# Patient Record
Sex: FEMALE | URBAN - METROPOLITAN AREA
[De-identification: names, ages, dates, MRNs, and addresses within clinical notes are randomized per-mention and may not be internally consistent; named-entity substitution may affect disease eponyms.]

---

## 2017-09-23 ENCOUNTER — HOSPITAL ENCOUNTER (EMERGENCY)
Facility: CLINIC | Age: 27
Discharge: HOME | End: 2017-09-24
Attending: EMERGENCY MEDICINE | Admitting: EMERGENCY MEDICINE
Payer: MEDICAID

## 2017-09-23 DIAGNOSIS — R41.0 DELIRIUM: ICD-10-CM

## 2017-09-23 DIAGNOSIS — Z65.9 OTHER SOCIAL STRESSOR: ICD-10-CM

## 2017-09-23 DIAGNOSIS — F10.929 ALCOHOL INTOXICATION, WITH UNSPECIFIED COMPLICATION (H): ICD-10-CM

## 2017-09-23 LAB
ALBUMIN UR-MCNC: NEGATIVE MG/DL
AMPHETAMINES UR QL SCN: NEGATIVE
ANION GAP SERPL CALCULATED.3IONS-SCNC: 12 MMOL/L (ref 3–14)
APPEARANCE UR: CLEAR
BARBITURATES UR QL: NEGATIVE
BASOPHILS # BLD AUTO: 0.1 10E9/L (ref 0–0.2)
BASOPHILS NFR BLD AUTO: 1.8 %
BENZODIAZ UR QL: NEGATIVE
BILIRUB UR QL STRIP: NEGATIVE
BUN SERPL-MCNC: 9 MG/DL (ref 7–30)
CALCIUM SERPL-MCNC: 8.9 MG/DL (ref 8.5–10.1)
CANNABINOIDS UR QL SCN: NEGATIVE
CHLORIDE SERPL-SCNC: 110 MMOL/L (ref 94–109)
CO2 SERPL-SCNC: 23 MMOL/L (ref 20–32)
COCAINE UR QL: NEGATIVE
COLOR UR AUTO: NORMAL
CREAT SERPL-MCNC: 0.46 MG/DL (ref 0.52–1.04)
DIFFERENTIAL METHOD BLD: NORMAL
EOSINOPHIL # BLD AUTO: 0.2 10E9/L (ref 0–0.7)
EOSINOPHIL NFR BLD AUTO: 2.8 %
ERYTHROCYTE [DISTWIDTH] IN BLOOD BY AUTOMATED COUNT: 12.5 % (ref 10–15)
ETHANOL SERPL-MCNC: 0.2 G/DL
GFR SERPL CREATININE-BSD FRML MDRD: >90 ML/MIN/1.7M2
GLUCOSE SERPL-MCNC: 97 MG/DL (ref 70–99)
GLUCOSE UR STRIP-MCNC: NEGATIVE MG/DL
HCG SERPL QL: NEGATIVE
HCG UR QL: NEGATIVE
HCT VFR BLD AUTO: 38.7 % (ref 35–47)
HGB BLD-MCNC: 13.2 G/DL (ref 11.7–15.7)
HGB UR QL STRIP: NEGATIVE
IMM GRANULOCYTES # BLD: 0 10E9/L (ref 0–0.4)
IMM GRANULOCYTES NFR BLD: 0.3 %
KETONES UR STRIP-MCNC: NEGATIVE MG/DL
LEUKOCYTE ESTERASE UR QL STRIP: NEGATIVE
LYMPHOCYTES # BLD AUTO: 2.7 10E9/L (ref 0.8–5.3)
LYMPHOCYTES NFR BLD AUTO: 39.3 %
MAGNESIUM SERPL-MCNC: 2.4 MG/DL (ref 1.6–2.3)
MCH RBC QN AUTO: 31.3 PG (ref 26.5–33)
MCHC RBC AUTO-ENTMCNC: 34.1 G/DL (ref 31.5–36.5)
MCV RBC AUTO: 92 FL (ref 78–100)
MONOCYTES # BLD AUTO: 0.7 10E9/L (ref 0–1.3)
MONOCYTES NFR BLD AUTO: 9.6 %
NEUTROPHILS # BLD AUTO: 3.1 10E9/L (ref 1.6–8.3)
NEUTROPHILS NFR BLD AUTO: 46.2 %
NITRATE UR QL: NEGATIVE
NRBC # BLD AUTO: 0 10*3/UL
NRBC BLD AUTO-RTO: 0 /100
OPIATES UR QL SCN: NEGATIVE
PCP UR QL SCN: NEGATIVE
PH UR STRIP: 7 PH (ref 5–7)
PLATELET # BLD AUTO: 324 10E9/L (ref 150–450)
POTASSIUM SERPL-SCNC: 3.2 MMOL/L (ref 3.4–5.3)
RBC # BLD AUTO: 4.22 10E12/L (ref 3.8–5.2)
RBC #/AREA URNS AUTO: <1 /HPF (ref 0–2)
SODIUM SERPL-SCNC: 145 MMOL/L (ref 133–144)
SOURCE: NORMAL
SP GR UR STRIP: 1.01 (ref 1–1.03)
SQUAMOUS #/AREA URNS AUTO: <1 /HPF (ref 0–1)
UROBILINOGEN UR STRIP-MCNC: 0 MG/DL (ref 0–2)
WBC # BLD AUTO: 6.8 10E9/L (ref 4–11)
WBC #/AREA URNS AUTO: 1 /HPF (ref 0–2)

## 2017-09-23 PROCEDURE — 83735 ASSAY OF MAGNESIUM: CPT | Performed by: EMERGENCY MEDICINE

## 2017-09-23 PROCEDURE — 80320 DRUG SCREEN QUANTALCOHOLS: CPT | Performed by: EMERGENCY MEDICINE

## 2017-09-23 PROCEDURE — 96376 TX/PRO/DX INJ SAME DRUG ADON: CPT

## 2017-09-23 PROCEDURE — 84703 CHORIONIC GONADOTROPIN ASSAY: CPT | Performed by: EMERGENCY MEDICINE

## 2017-09-23 PROCEDURE — 80307 DRUG TEST PRSMV CHEM ANLYZR: CPT | Performed by: EMERGENCY MEDICINE

## 2017-09-23 PROCEDURE — 25000128 H RX IP 250 OP 636: Performed by: EMERGENCY MEDICINE

## 2017-09-23 PROCEDURE — 81025 URINE PREGNANCY TEST: CPT | Performed by: EMERGENCY MEDICINE

## 2017-09-23 PROCEDURE — 80048 BASIC METABOLIC PNL TOTAL CA: CPT | Performed by: EMERGENCY MEDICINE

## 2017-09-23 PROCEDURE — 25000128 H RX IP 250 OP 636

## 2017-09-23 PROCEDURE — 96374 THER/PROPH/DIAG INJ IV PUSH: CPT

## 2017-09-23 PROCEDURE — 81001 URINALYSIS AUTO W/SCOPE: CPT | Performed by: EMERGENCY MEDICINE

## 2017-09-23 PROCEDURE — 85025 COMPLETE CBC W/AUTO DIFF WBC: CPT | Performed by: EMERGENCY MEDICINE

## 2017-09-23 PROCEDURE — 99285 EMERGENCY DEPT VISIT HI MDM: CPT | Mod: 25

## 2017-09-23 RX ORDER — LORAZEPAM 2 MG/ML
1 INJECTION INTRAMUSCULAR ONCE
Status: COMPLETED | OUTPATIENT
Start: 2017-09-23 | End: 2017-09-23

## 2017-09-23 RX ORDER — LORAZEPAM 2 MG/ML
INJECTION INTRAMUSCULAR
Status: COMPLETED
Start: 2017-09-23 | End: 2017-09-23

## 2017-09-23 RX ADMIN — LORAZEPAM 1 MG: 2 INJECTION INTRAMUSCULAR at 20:11

## 2017-09-23 RX ADMIN — LORAZEPAM 1 MG: 2 INJECTION INTRAMUSCULAR; INTRAVENOUS at 21:03

## 2017-09-23 RX ADMIN — LORAZEPAM 1 MG: 2 INJECTION INTRAMUSCULAR; INTRAVENOUS at 20:11

## 2017-09-23 ASSESSMENT — ENCOUNTER SYMPTOMS
ABDOMINAL PAIN: 0
NAUSEA: 0
VOMITING: 0
NERVOUS/ANXIOUS: 1

## 2017-09-23 NOTE — ED AVS SNAPSHOT
[facility] Emergency Department    [address] [place] [address]    Phone:  [phone]    Fax:  [phone]                                       Diane Ryan   MRN: [id number]    Department:  [facility] Emergency Department   Date of Visit:  9/23/2017           Patient Information     Date Of Birth          1990        Your diagnoses for this visit were:     Delirium     Alcohol intoxication, with unspecified complication (H)     Other social stressor        You were seen by Lester Goldstein DO, Amdahl, John, MD, and Mario Winston MD.      Follow-up Information     Follow up with PCP as well as mental health as directed by DEC. .        Discharge Instructions         Alcohol Intoxication  Alcohol intoxication occurs when you drink alcohol faster than your liver can remove it from your system. The following facts are important to remember:    It can take 10 minutes or more to start to feel the effects of a drink, so you can easily get more intoxicated than you intended.    One drink may be more than 1 serving of alcohol. Depending on the drink, it can be 2 to 4 servings.    It takes about an hour for your body to metabolize (clear) 1 serving. If you have more than 1 drink, it can take a couple of hours or more.    Many things affect how drinks will affect you, including whether you ve eaten, how fast you drink, your size, how much you normally drink (or not), medicines you take, chronic diseases you have, and gender.  Signs and symptoms of alcohol poisoning  The following are signs and symptoms of alcohol poisoning:  Mild impairment    Reduced inhibitions    Slurred speech    Drowsiness    Decreased fine motor skills  Moderate impairment    Erratic behavior, aggression, depression    Impaired judgment    Confusion    Concentration difficulties    Coordination problems  Severe impairment    Vomiting    Seizures    Unconsciousness    Cold,  "clammy    Slow or irregular breathing    Hypothermia (low body temperature)    Coma  Health effects  Alcohol abuse causes health problems. Sometimes this can happen after only drinking a  little.\" There is no set number of drinks or amount of alcohol that defines too much. The more you drink at one time, and the more frequently you drink determine both the short-term and long-term health effects. It affects all parts of your body and your health, including your:    Brain. Alcohol is a central nervous system depressant. It can damage parts of the brain that affect your balance, memory, thinking, and emotions. It can cause memory loss, blackouts, depression, agitation, sleep cycle changes, and seizures. These changes may or may not be reversible.    Heart and vascular system. Alcohol affects multiple areas. It can damage heart muscle causing cardiomyopathy, which is a weakening and stretching of the heart muscle. This can lead to trouble breathing, an irregular heartbeat, atrial fibrillation, leg swelling, and heart failure. It makes the blood vessels stiffen causing hypertension (high blood pressure). All of these problems increase your risk of having heart attacks or strokes.    Liver. Alcohol causes fat to build up in the liver, affecting its normal function. This increases the risk for hepatitis, leading to abdominal pain, appetite loss, jaundice, bleeding problems, liver fibrosis, and cirrhosis. This in turn can affect your ability to fight off infections, and can cause diabetes. The liver changes prevent it from removing toxins in your blood that can cause encephalopathy. Signs of this are confusion, altered level of consciousness, personality changes, memory loss, seizures, coma, and death.    Pancreas. Alcohol can cause inflammation of the pancreas, or pancreatitis. This can cause pain in your abdomen, fever, and diabetes.    Immune system. Alcohol weakens your immune system in a number of ways. It suppresses " your immune system making it harder to fight off infections and colds. You will also have a higher risk of certain infections like pneumonia and tuberculosis.    Cancer risk. Alcohol raises your risk of cancer of the mouth, esophagus, pharynx, larynx, liver, and breast.    Sexual function. Alcohol abuse can also lead to sexual problems.  Alcohol use during pregnancy may cause permanent damage to the growing baby.  Home care  The following guidelines will help you care for yourself at home:    Don't drink any more alcohol.    Don't drive until all effects of the alcohol have worn off.    Don't operate machinery that can cause injuries.    Get lots of rest over the next few days. Drink plenty of water and other non-alcoholic liquids. Try to eat regular meals.    If you have been drinking heavily on a daily basis, you may go through alcohol withdrawal. The usual symptoms last 3 to 4 days and may include nervousness, shakiness, nausea, sweating, sleeplessness, and can even cause seizures and a serious withdrawal symptom called delirium tremens, or DTs. During this time, it is best that you stay with family or friends who can help and support you. You can also admit yourself to a residential detox program. If your symptoms are severe (seizures, severe shakiness, confusion), contact your doctor or call an ambulance for help (see below).   Follow-up care  If alcohol is a problem in your life, these are some organizations that can help you:    Alcoholics Anonymous offers support through a self-help fellowship. There are no dues or fees. See the Yellow Pages and call for time and place of meetings. Find AA online at www.aa.org.    [employer] offers support to families of alcohol users. Contact 823-652-1768, or online at www.al-anoaubree.org.    [employer] on Alcoholism and Drug Dependence can be reached at 692-090-4998, or online at www.ncadd.org.    There are also inpatient and residential alcohol detox programs. Check the  Internet or phonebook Yellow Pages under  Drug Abuse and Treatment Centers.   Call 911  Call 911 if any of these occur:    Trouble breathing or slow irregular breathing    Chest pain    Sudden weakness on one side of your body or sudden trouble speaking    Heavy bleeding or vomiting blood    Very drowsy or trouble awakening    Fainting or loss of consciousness    Rapid heart rate    Seizure  When to seek medical advice  Call your healthcare provider right away if any of these occur:    Severe shakiness     Fever over 100.4  F (38.0  C)    Confusion or hallucinations (seeing, hearing, or feeling things that are not there)    Pain in your upper abdomen that gets worse    Repeated vomiting  Date Last Reviewed: 6/1/2016 2000-2017 [email or web address]. [address] [place] [address], [place] [address]. All rights reserved. This information is not intended as a substitute for professional medical care. Always follow your healthcare professional's instructions.          Discharge References/Attachments     STRESS, KEYS TO MANAGING (ENGLISH)      24 Hour Appointment Hotline       To make an appointment at any [facility], call [phone] ([phone]). If you don't have a family doctor or clinic, we will help you find one. [facility] are conveniently located to serve the needs of you and your family.             Review of your medicines      Our records show that you are taking the medicines listed below. If these are incorrect, please call your family doctor or clinic.        Dose / Directions Last dose taken    SYNTHROID PO   Dose:  50 mcg        Take 50 mcg by mouth daily   Refills:  0        UNABLE TO FIND        MEDICATION NAME: birth control   Refills:  0                Procedures and tests performed during your visit     Alcohol ethyl    Basic metabolic panel    CBC with platelets differential    Drug abuse screen 77 urine (FL, RH, SH)    EKG 12 lead    HCG qualitative    HCG qualitative urine     Magnesium    Restraints Non-Violent or Non-Self Destructive    UA with Microscopic      Orders Needing Specimen Collection     None      Pending Results     No orders found for last 3 day(s).            Pending Culture Results     No orders found for last 3 day(s).            Pending Results Instructions     If you had any lab results that were not finalized at the time of your Discharge, you can call the ED Lab Result RN at [phone]. You will be contacted by this team for any positive Lab results or changes in treatment. The nurses are available 7 days a week from 10A to 6:30P.  You can leave a message 24 hours per day and they will return your call.        Test Results From Your Hospital Stay        9/23/2017  8:28 PM      Component Results     Component Value Ref Range & Units Status    WBC 6.8 4.0 - 11.0 10e9/L Final    RBC Count 4.22 3.8 - 5.2 10e12/L Final    Hemoglobin 13.2 11.7 - 15.7 g/dL Final    Hematocrit 38.7 35.0 - 47.0 % Final    MCV 92 78 - 100 fl Final    MCH 31.3 26.5 - 33.0 pg Final    MCHC 34.1 31.5 - 36.5 g/dL Final    RDW 12.5 10.0 - 15.0 % Final    Platelet Count 324 150 - 450 10e9/L Final    Diff Method Automated Method  Final    % Neutrophils 46.2 % Final    % Lymphocytes 39.3 % Final    % Monocytes 9.6 % Final    % Eosinophils 2.8 % Final    % Basophils 1.8 % Final    % Immature Granulocytes 0.3 % Final    Nucleated RBCs 0 0 /100 Final    Absolute Neutrophil 3.1 1.6 - 8.3 10e9/L Final    Absolute Lymphocytes 2.7 0.8 - 5.3 10e9/L Final    Absolute Monocytes 0.7 0.0 - 1.3 10e9/L Final    Absolute Eosinophils 0.2 0.0 - 0.7 10e9/L Final    Absolute Basophils 0.1 0.0 - 0.2 10e9/L Final    Abs Immature Granulocytes 0.0 0 - 0.4 10e9/L Final    Absolute Nucleated RBC 0.0  Final         9/23/2017  8:44 PM      Component Results     Component Value Ref Range & Units Status    Sodium 145 (H) 133 - 144 mmol/L Final    Potassium 3.2 (L) 3.4 - 5.3 mmol/L Final    Chloride 110 (H) 94 - 109 mmol/L  Final    Carbon Dioxide 23 20 - 32 mmol/L Final    Anion Gap 12 3 - 14 mmol/L Final    Glucose 97 70 - 99 mg/dL Final    Urea Nitrogen 9 7 - 30 mg/dL Final    Creatinine 0.46 (L) 0.52 - 1.04 mg/dL Final    GFR Estimate >90 >60 mL/min/1.7m2 Final    Non  GFR Calc    GFR Estimate If Black >90 >60 mL/min/1.7m2 Final    African American GFR Calc    Calcium 8.9 8.5 - 10.1 mg/dL Final         9/23/2017  8:44 PM      Component Results     Component Value Ref Range & Units Status    Magnesium 2.4 (H) 1.6 - 2.3 mg/dL Final         9/23/2017  8:44 PM      Component Results     Component Value Ref Range & Units Status    Ethanol g/dL 0.20 (H) <0.01 g/dL Final         9/23/2017  8:56 PM      Component Results     Component Value Ref Range & Units Status    HCG Qualitative Serum Negative NEG^Negative Final    This test is for screening purposes.  Results should be interpreted along with   the clinical picture.  Confirmation testing is available if warranted by   ordering [id number], HCG Quantitative Pregnancy.           9/23/2017 10:15 PM      Component Results     Component Value Ref Range & Units Status    Color Urine Straw  Final    Appearance Urine Clear  Final    Glucose Urine Negative NEG^Negative mg/dL Final    Bilirubin Urine Negative NEG^Negative Final    Ketones Urine Negative NEG^Negative mg/dL Final    Specific Gravity Urine 1.006 1.003 - 1.035 Final    Blood Urine Negative NEG^Negative Final    pH Urine 7.0 5.0 - 7.0 pH Final    Protein Albumin Urine Negative NEG^Negative mg/dL Final    Urobilinogen mg/dL 0.0 0.0 - 2.0 mg/dL Final    Nitrite Urine Negative NEG^Negative Final    Leukocyte Esterase Urine Negative NEG^Negative Final    Source Catheterized Urine  Final    WBC Urine 1 0 - 2 /HPF Final    RBC Urine <1 0 - 2 /HPF Final    Squamous Epithelial /HPF Urine <1 0 - 1 /HPF Final         9/23/2017 10:19 PM      Component Results     Component Value Ref Range & Units Status    HCG Qual Urine Negative  NEG^Negative Final    This test is for screening purposes.  Results should be interpreted along with   the clinical picture.  Confirmation testing is available if warranted by   ordering [id number], HCG Quantitative Pregnancy.           9/23/2017 11:30 PM      Component Results     Component Value Ref Range & Units Status    Amphetamine Qual Urine Negative NEG^Negative Final    Cutoff for a negative amphetamine is 500 ng/mL or less.    Barbiturates Qual Urine Negative NEG^Negative Final    Cutoff for a negative barbiturate is 200 ng/mL or less.    Benzodiazepine Qual Urine Negative NEG^Negative Final    Cutoff for a negative benzodiazepine is 200 ng/mL or less.    Cannabinoids Qual Urine Negative NEG^Negative Final    Cutoff for a negative cannabinoid is 50 ng/mL or less.    Cocaine Qual Urine Negative NEG^Negative Final    Cutoff for a negative cocaine is 300 ng/mL or less.    Opiates Qualitative Urine Negative NEG^Negative Final    Cutoff for a negative opiate is 300 ng/mL or less.    PCP Qual Urine Negative NEG^Negative Final    Cutoff for a negative PCP is 25 ng/mL or less.                Clinical Quality Measure: Blood Pressure Screening     Your blood pressure was checked while you were in the emergency department today. The last reading we obtained was  BP: 103/68 . Please read the guidelines below about what these numbers mean and what you should do about them.  If your systolic blood pressure (the top number) is less than 120 and your diastolic blood pressure (the bottom number) is less than 80, then your blood pressure is normal. There is nothing more that you need to do about it.  If your systolic blood pressure (the top number) is 120-139 or your diastolic blood pressure (the bottom number) is 80-89, your blood pressure may be higher than it should be. You should have your blood pressure rechecked within a year by a primary care provider.  If your systolic blood pressure (the top number) is 140 or greater or  "your diastolic blood pressure (the bottom number) is 90 or greater, you may have high blood pressure. High blood pressure is treatable, but if left untreated over time it can put you at risk for heart attack, stroke, or kidney failure. You should have your blood pressure rechecked by a primary care provider within the next 4 weeks.  If your provider in the emergency department today gave you specific instructions to follow-up with your doctor or provider even sooner than that, you should follow that instruction and not wait for up to 4 weeks for your follow-up visit.        Thank you for choosing [facility]       Thank you for choosing [facility] for your care. Our goal is always to provide you with excellent care. Hearing back from our patients is one way we can continue to improve our services. Please take a few minutes to complete the written survey that you may receive in the mail after you visit with us. Thank you!        [facility] Information     [employer]ago lets you send messages to your doctor, view your test results, renew your prescriptions, schedule appointments and more. To sign up, go to [email or web address] . Click on \"Log in\" on the left side of the screen, which will take you to the Welcome page. Then click on \"Sign up Now\" on the right side of the page.     You will be asked to enter the access code listed below, as well as some personal information. Please follow the directions to create your username and password.     Your access code is: [email or web address]  Expires: 2017  8:03 PM     Your access code will  in 90 days. If you need help or a new code, please call your [facility] clinic or [phone].        Care EveryWhere ID     This is your Care EveryWhere ID. This could be used by other organizations to access your [facility] medical records  [id number]        Equal Access to Services     TIM SEAMAN AH: adria Lock, chacorta orona " cecelia mcgarry ah. So [facility] [phone].    ATENCIÓN: Si habla español, tiene a carmona disposición servicios gratuitos de asistencia lingüística. Llame al [phone].    We comply with applicable federal civil rights laws and [place] laws. We do not discriminate on the basis of race, color, national origin, age, disability sex, sexual orientation or gender identity.            After Visit Summary       This is your record. Keep this with you and show to your community pharmacist(s) and doctor(s) at your next visit.

## 2017-09-23 NOTE — ED AVS SNAPSHOT
[facility] Emergency Department    [address]    [facility] [address]    Phone:  [phone]    Fax:  [phone]                                       Diane Ryan   MRN: [id number]    Department:  [facility] Emergency Department   Date of Visit:  9/23/2017           After Visit Summary Signature Page     I have received my discharge instructions, and my questions have been answered. I have discussed any challenges I see with this plan with the nurse or doctor.    ..........................................................................................................................................  Patient/Patient Representative Signature      ..........................................................................................................................................  Patient Representative Print Name and Relationship to Patient    ..................................................               ................................................  Date                                            Time    ..........................................................................................................................................  Reviewed by Signature/Title    ...................................................              ..............................................  Date                                                            Time

## 2017-09-24 VITALS
DIASTOLIC BLOOD PRESSURE: 70 MMHG | SYSTOLIC BLOOD PRESSURE: 112 MMHG | HEART RATE: 88 BPM | TEMPERATURE: 98.5 F | OXYGEN SATURATION: 100 % | RESPIRATION RATE: 18 BRPM

## 2017-09-24 LAB — INTERPRETATION ECG - MUSE: NORMAL

## 2017-09-24 PROCEDURE — 25000132 ZZH RX MED GY IP 250 OP 250 PS 637: Performed by: EMERGENCY MEDICINE

## 2017-09-24 PROCEDURE — 90791 PSYCH DIAGNOSTIC EVALUATION: CPT

## 2017-09-24 RX ORDER — ACETAMINOPHEN 500 MG
1000 TABLET ORAL ONCE
Status: COMPLETED | OUTPATIENT
Start: 2017-09-24 | End: 2017-09-24

## 2017-09-24 RX ADMIN — ACETAMINOPHEN 1000 MG: 500 TABLET, FILM COATED ORAL at 05:43

## 2017-09-24 NOTE — ED PROVIDER NOTES
History     Chief Complaint:  Altered Mental Status    History limited secondary to patient's altered mental status.    The history is provided by the EMS personnel.      Diane Ryan is a 27 year old female who presents to the emergency department today via EMS for evaluation of altered mental status. EMS report that the patient arrived at her aunt's house this evening acting abnormally. They state that the patient was anxious, hyperventilating, moaning and groaning, and not speaking in clear sentences en route to the ER. They also state that she was blowing air out of her nose and clutching her chest. EMS report that the patient smells of alcohol with possible emesis. They note that her family reported that the patient had a history of excessive drinking recently but no known history of drug use.    After getting Ativan, the patient calmed down and was able to state that she has had a lot of stress due to school and her living arrangements. She states she lives with her aunt but also states that she feels that her living arrangements are not stable. She denied suicidal ideations but seems very stressed over her living situation. She denies nausea, vomiting, or abdominal pain.    Allergies:  No Known Drug Allergies     Medications:    Synthroid    Past Medical History:    Thyroid disease    Past Surgical History:    History reviewed. No pertinent surgical history.    Family History:    History reviewed. No pertinent family history.    Social History:  Smoking Status: Never Smoker  Smokeless Tobacco: Never Used  Alcohol Use: Positive     Review of Systems   Unable to perform ROS: Mental status change   Gastrointestinal: Negative for abdominal pain, nausea and vomiting.   Psychiatric/Behavioral: Negative for suicidal ideas. The patient is nervous/anxious.    All other systems reviewed and are negative.    Physical Exam     Vitals:  Patient Vitals for the past 24 hrs:   BP Temp Temp src Pulse Resp SpO2    09/23/17 2350 98/75 - - - - 100 %   09/23/17 2335 105/71 - - - - 100 %   09/23/17 2320 97/65 - - - - 99 %   09/23/17 2305 107/70 - - - - 100 %   09/23/17 2250 93/55 - - - - 99 %   09/23/17 2220 94/64 - - - - 100 %   09/23/17 2150 91/56 - - - - 99 %   09/23/17 2121 - - - - - 98 %   09/23/17 2120 93/54 - - - - -   09/23/17 2050 106/79 - - - - 97 %   09/23/17 2030 109/69 - - - - -   09/23/17 2021 - 98.5  F (36.9  C) Temporal 119 28 99 %     Physical Exam  Constitutional: Patient appears well-developed and well-nourished. There is moderate distress.   Head: No external signs of trauma noted.  Neck: No JVD noted  Eyes: Pupils are equal, round, and reactive to light.   Cardiovascular: Tachycardic rate, regular rhythm and normal heart sounds.  Exam reveals no gallop and no friction rub.  No murmur heard. Normal peripheral pulses.  Pulmonary/Chest: Effort normal and breath sounds normal. No respiratory distress. Patient has no wheezes. Patient has no rales.   Abdominal: Soft. There is no tenderness.   Extremities: No edema noted. No deformities appreciated.  Neurological: Patient is agitated, but moving all extremities. Unable to complete a full neuro exam.  Skin: Skin is warm and dry. There is no diaphoresis noted.   Psychiatric: Anxious, tearful.        Emergency Department Course     ECG:  ECG taken at 2029  Sinus tachycardia  Otherwise normal ECG  Rate 128 bpm. AZ interval 148 ms. QRS duration 68 ms. QT/QTc 316/461 ms. P-R-T axes 67 42 29.    Laboratory:  Laboratory findings were communicated with the patient who voiced understanding of the findings.    CBC: WBC 6.8, HGB 13.2,   BMP:  (H), Potassium 3.2 (L), Chloride 110 (H), Creatinine 0.46 (L), o/w WNL  Magnesium: 2.4 (H)  Alcohol ethyl: 0.20 (H)  HCG qualitative blood: Negative  UA with Microscopic: WNL  HCG qualitative urine: Negative  UDS: Negative    Interventions:  2011 Ativan 1 mg IV  2103 Ativan 1 mg IV    Emergency Department Course:  Nursing  notes and vitals reviewed.  I performed an exam of the patient as documented above.   IV was inserted and blood was drawn for laboratory testing, results above.  Patient was signed out for further work up, treatment, and DEC assessment.  I personally reviewed the ECG and laboratory results with the patient and answered all related questions prior to discharge.    Impression & Plan      Medical Decision Making:  Diane Ryan is a 27 year old female in room 4. Patient presents to the ER for evaluation of anxiety reaction. Please see the HPI and exam for specifics. The patient admits to drinking lots of alcohol but is under significant mental stressors. She was able to be calmed with ativan but did require soft restraints of her wrists due to pulling at her IV line. The patient, once sober, will be evaluated by DEC. She will be signed out to the oncoming team for follow up.    Impressions:      ICD-10-CM    1. Delirium R41.0    2. Alcohol intoxication, with unspecified complication (H) F10.929    3. Other social stressor Z65.9        Disposition:  Patient was signed out.  Scribe Disclosure:  I, [place], am serving as a scribe at 8:07 PM on 9/23/2017 to document services personally performed by Lester Goldstein DO based on my observations and the provider's statements to me.  [facility] EMERGENCY DEPARTMENT       Lester Goldstein DO  09/24/17 0004

## 2017-09-24 NOTE — ED NOTES
Pt Discharged.  Pt. Requested Aunt Preethi to be called for pickup.  Aunt called.  No answer.  Message left to call ED.

## 2017-09-24 NOTE — ED NOTES
Recheck:  Patient now awake and more appropriate.  States she doesn't remember much from last night.  No psychosis at this time.  Denies SI/HI.  Does state she is under a lot of stress. Amenable to speaking with DEC regarding outpatient mental health referral.       Candido Govea MD  09/24/17 4239

## 2017-09-24 NOTE — ED NOTES
Patient signed out to me at shift change by Dr Goldstein ([id number]).  Patient currently sedated with Ativan. In soft restraints.  Presented with delirium, positive alcohol intoxication.  UDS negative.  Patient was unreliable historian and PARVIN was placed.  Made statements about being stressed out over living situation.  Will remain on PARVIN hold until able to reliably reassess.  Anticipate VDEC assessment at some point this evening.   HR initially at 120 bpm.  This has improved, currently 90.   No fever.       Candido Govea MD  09/23/17 5671

## 2017-09-24 NOTE — ED NOTES
"Pt presents via EMS for evaluation of ETOH and anxiety. Pt arrived to aunts house somehow acting abnormal. Pt is hyperventilating, moaning and groaning, not speaking in cear sentences. Pt continues to grab chest and says \"it hurts\". Pt not following commands per EMS or for nursing staff. Per family, pt has been drinking lately, unknown drug use. Per EMS, smells of ETOH with possible emesis.  "

## 2017-09-24 NOTE — ED NOTES
"Recheck:  Attempted to speak with patient.  +EtOH odor.  She told me to \"check the papers, they are in storage.\"  Will continue hold.  Removing soft restraints at this time.      Candido Govea MD  09/24/17 0106    "

## 2017-09-24 NOTE — ED PROVIDER NOTES
Patient resting comfortably.  No delirium agitation, or intoxication. Denies thoughts of self harm.  DEC evaluated patient and gave referrals.  No indication for inpatient treatment or emergent outpatient Psychiatric follow up.     Mario Winston MD  09/24/17 [id number]

## 2017-09-24 NOTE — ED NOTES
Bed: ED04  Expected date: 9/23/17  Expected time: 7:54 PM  Means of arrival: Ambulance  Comments:  [facility]3

## 2017-09-24 NOTE — DISCHARGE INSTRUCTIONS
"  Alcohol Intoxication  Alcohol intoxication occurs when you drink alcohol faster than your liver can remove it from your system. The following facts are important to remember:    It can take 10 minutes or more to start to feel the effects of a drink, so you can easily get more intoxicated than you intended.    One drink may be more than 1 serving of alcohol. Depending on the drink, it can be 2 to 4 servings.    It takes about an hour for your body to metabolize (clear) 1 serving. If you have more than 1 drink, it can take a couple of hours or more.    Many things affect how drinks will affect you, including whether you ve eaten, how fast you drink, your size, how much you normally drink (or not), medicines you take, chronic diseases you have, and gender.  Signs and symptoms of alcohol poisoning  The following are signs and symptoms of alcohol poisoning:  Mild impairment    Reduced inhibitions    Slurred speech    Drowsiness    Decreased fine motor skills  Moderate impairment    Erratic behavior, aggression, depression    Impaired judgment    Confusion    Concentration difficulties    Coordination problems  Severe impairment    Vomiting    Seizures    Unconsciousness    Cold, clammy    Slow or irregular breathing    Hypothermia (low body temperature)    Coma  Health effects  Alcohol abuse causes health problems. Sometimes this can happen after only drinking a  little.\" There is no set number of drinks or amount of alcohol that defines too much. The more you drink at one time, and the more frequently you drink determine both the short-term and long-term health effects. It affects all parts of your body and your health, including your:    Brain. Alcohol is a central nervous system depressant. It can damage parts of the brain that affect your balance, memory, thinking, and emotions. It can cause memory loss, blackouts, depression, agitation, sleep cycle changes, and seizures. These changes may or may not be " reversible.    Heart and vascular system. Alcohol affects multiple areas. It can damage heart muscle causing cardiomyopathy, which is a weakening and stretching of the heart muscle. This can lead to trouble breathing, an irregular heartbeat, atrial fibrillation, leg swelling, and heart failure. It makes the blood vessels stiffen causing hypertension (high blood pressure). All of these problems increase your risk of having heart attacks or strokes.    Liver. Alcohol causes fat to build up in the liver, affecting its normal function. This increases the risk for hepatitis, leading to abdominal pain, appetite loss, jaundice, bleeding problems, liver fibrosis, and cirrhosis. This in turn can affect your ability to fight off infections, and can cause diabetes. The liver changes prevent it from removing toxins in your blood that can cause encephalopathy. Signs of this are confusion, altered level of consciousness, personality changes, memory loss, seizures, coma, and death.    Pancreas. Alcohol can cause inflammation of the pancreas, or pancreatitis. This can cause pain in your abdomen, fever, and diabetes.    Immune system. Alcohol weakens your immune system in a number of ways. It suppresses your immune system making it harder to fight off infections and colds. You will also have a higher risk of certain infections like pneumonia and tuberculosis.    Cancer risk. Alcohol raises your risk of cancer of the mouth, esophagus, pharynx, larynx, liver, and breast.    Sexual function. Alcohol abuse can also lead to sexual problems.  Alcohol use during pregnancy may cause permanent damage to the growing baby.  Home care  The following guidelines will help you care for yourself at home:    Don't drink any more alcohol.    Don't drive until all effects of the alcohol have worn off.    Don't operate machinery that can cause injuries.    Get lots of rest over the next few days. Drink plenty of water and other non-alcoholic liquids.  Try to eat regular meals.    If you have been drinking heavily on a daily basis, you may go through alcohol withdrawal. The usual symptoms last 3 to 4 days and may include nervousness, shakiness, nausea, sweating, sleeplessness, and can even cause seizures and a serious withdrawal symptom called delirium tremens, or DTs. During this time, it is best that you stay with family or friends who can help and support you. You can also admit yourself to a residential detox program. If your symptoms are severe (seizures, severe shakiness, confusion), contact your doctor or call an ambulance for help (see below).   Follow-up care  If alcohol is a problem in your life, these are some organizations that can help you:    Alcoholics Anonymous offers support through a self-help fellowship. There are no dues or fees. See the Yellow Pages and call for time and place of meetings. Find AA online at www.aa.org.    [employer] offers support to families of alcohol users. Contact 314-342-1531, or online at www.al-anoaubree.org.    National [place] on Alcoholism and Drug Dependence can be reached at 572-956-7935, or online at www.ncadd.org.    There are also inpatient and residential alcohol detox programs. Check the Internet or phonebook Yellow Pages under  D[facility] Abuse [facility] Treatment [facility].   Call 911  Call 911 if any of these occur:    Trouble breathing or slow irregular breathing    Chest pain    Sudden weakness on one side of your body or sudden trouble speaking    Heavy bleeding or vomiting blood    Very drowsy or trouble awakening    Fainting or loss of consciousness    Rapid heart rate    Seizure  When to seek medical advice  Call your healthcare provider right away if any of these occur:    Severe shakiness     Fever over 100.4  F (38.0  C)    Confusion or hallucinations (seeing, hearing, or feeling things that are not there)    Pain in your upper abdomen that gets worse    Repeated vomiting  Date Last Reviewed: 6/1/2016 2000-2017 [employer]. [address] [place] [address], [place] [address]. All rights reserved. This information is not intended as a substitute for professional medical care. Always follow your healthcare professional's instructions.